# Patient Record
Sex: FEMALE | Race: WHITE | NOT HISPANIC OR LATINO
[De-identification: names, ages, dates, MRNs, and addresses within clinical notes are randomized per-mention and may not be internally consistent; named-entity substitution may affect disease eponyms.]

---

## 2020-10-20 PROBLEM — Z00.00 ENCOUNTER FOR PREVENTIVE HEALTH EXAMINATION: Status: ACTIVE | Noted: 2020-10-20

## 2020-10-21 ENCOUNTER — APPOINTMENT (OUTPATIENT)
Dept: PULMONOLOGY | Facility: CLINIC | Age: 70
End: 2020-10-21
Payer: MEDICARE

## 2020-10-21 VITALS
TEMPERATURE: 97.6 F | HEIGHT: 60 IN | HEART RATE: 84 BPM | SYSTOLIC BLOOD PRESSURE: 157 MMHG | DIASTOLIC BLOOD PRESSURE: 105 MMHG | OXYGEN SATURATION: 96 % | WEIGHT: 129 LBS | BODY MASS INDEX: 25.32 KG/M2

## 2020-10-21 DIAGNOSIS — R93.89 ABNORMAL FINDINGS ON DIAGNOSTIC IMAGING OF OTHER SPECIFIED BODY STRUCTURES: ICD-10-CM

## 2020-10-21 DIAGNOSIS — Z86.79 PERSONAL HISTORY OF OTHER DISEASES OF THE CIRCULATORY SYSTEM: ICD-10-CM

## 2020-10-21 PROCEDURE — 99203 OFFICE O/P NEW LOW 30 MIN: CPT

## 2020-10-21 RX ORDER — AMLODIPINE BESYLATE 5 MG/1
TABLET ORAL
Refills: 0 | Status: ACTIVE | COMMUNITY

## 2020-10-21 NOTE — PHYSICAL EXAM
[No Acute Distress] : no acute distress [Normal Oropharynx] : normal oropharynx [Normal Appearance] : normal appearance [No Neck Mass] : no neck mass [Normal Rate/Rhythm] : normal rate/rhythm [Normal S1, S2] : normal s1, s2 [No Murmurs] : no murmurs [No Resp Distress] : no resp distress [No Abnormalities] : no abnormalities [Benign] : benign [Normal Gait] : normal gait [No Clubbing] : no clubbing [No Cyanosis] : no cyanosis [No Edema] : no edema [FROM] : FROM [Normal Color/ Pigmentation] : normal color/ pigmentation [No Focal Deficits] : no focal deficits [Oriented x3] : oriented x3 [Normal Affect] : normal affect [TextBox_68] : mild expiratory wheezing bilaterally

## 2020-10-21 NOTE — DISCUSSION/SUMMARY
[FreeTextEntry1] : 69 yo female with chronic cough likely related to airway hyperreactivity. She is to have PFT after covid 19 swab as per protocol.I reviewed the chest CT images on line and discussed the findings with the patient.A repeat study will be performed in one year to follow up the nodule noted on recent study.GERD evaluation and treatment was recommended. She is to follow up with her PMD as before.

## 2020-10-21 NOTE — HISTORY OF PRESENT ILLNESS
[Never] : never [TextBox_4] : 71 yo female presents for evaluation of abnormal chest CT finding. The study was performed because of chronic cough. The patient complains of dry cough for one year  without fever, chest pain or hemoptysis.She was treated with inhaled med once two years ago. She has GERD like complaints but denies allergies. 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

## 2020-10-28 ENCOUNTER — APPOINTMENT (OUTPATIENT)
Dept: PULMONOLOGY | Facility: CLINIC | Age: 70
End: 2020-10-28
Payer: MEDICARE

## 2020-10-28 VITALS
SYSTOLIC BLOOD PRESSURE: 152 MMHG | HEIGHT: 62 IN | OXYGEN SATURATION: 97 % | TEMPERATURE: 98.3 F | HEART RATE: 82 BPM | DIASTOLIC BLOOD PRESSURE: 100 MMHG

## 2020-10-28 DIAGNOSIS — R05 COUGH: ICD-10-CM

## 2020-10-28 DIAGNOSIS — Z87.09 PERSONAL HISTORY OF OTHER DISEASES OF THE RESPIRATORY SYSTEM: ICD-10-CM

## 2020-10-28 PROCEDURE — 99213 OFFICE O/P EST LOW 20 MIN: CPT

## 2020-10-28 RX ORDER — METHYLPREDNISOLONE 4 MG/1
4 TABLET ORAL
Qty: 1 | Refills: 0 | Status: ACTIVE | COMMUNITY
Start: 2020-10-28 | End: 1900-01-01

## 2020-10-28 RX ORDER — MONTELUKAST 10 MG/1
10 TABLET, FILM COATED ORAL
Qty: 90 | Refills: 3 | Status: ACTIVE | COMMUNITY
Start: 2020-10-28 | End: 1900-01-01

## 2020-10-28 RX ORDER — OMEPRAZOLE 40 MG/1
40 CAPSULE, DELAYED RELEASE ORAL
Refills: 0 | Status: ACTIVE | COMMUNITY
Start: 2020-10-28

## 2020-10-28 RX ORDER — ALBUTEROL SULFATE 90 UG/1
108 (90 BASE) INHALANT RESPIRATORY (INHALATION)
Qty: 1 | Refills: 3 | Status: ACTIVE | COMMUNITY
Start: 2020-10-28 | End: 1900-01-01

## 2020-11-07 VITALS
OXYGEN SATURATION: 97 % | DIASTOLIC BLOOD PRESSURE: 70 MMHG | WEIGHT: 129 LBS | RESPIRATION RATE: 16 BRPM | BODY MASS INDEX: 23.74 KG/M2 | HEIGHT: 62 IN | SYSTOLIC BLOOD PRESSURE: 152 MMHG | HEART RATE: 82 BPM | TEMPERATURE: 98.3 F

## 2020-11-07 PROBLEM — R05 COUGH: Status: ACTIVE | Noted: 2020-10-21

## 2020-11-07 NOTE — HISTORY OF PRESENT ILLNESS
[Never] : never [TextBox_4] : 69 yo female with hx of cough presents for follow up and PFT. The patient complains of increased productive cough and wheezing for three days. She denies fever, chest pain or hemoptysis. She continues to have GERD related complaints. [TextBox_29] : Denies snoring, daytime somnolence, apneic episodes, AM headaches

## 2020-11-07 NOTE — DISCUSSION/SUMMARY
[FreeTextEntry1] : 71 yo female with acute bronchitis . Medrol pack prescribed with montelukast daily and PRN albuterol MDI. GERD treatment discussed. PFT will be deferred for the future. She is to follow up with her PMD as before.

## 2020-11-18 ENCOUNTER — APPOINTMENT (OUTPATIENT)
Dept: PULMONOLOGY | Facility: CLINIC | Age: 70
End: 2020-11-18

## 2020-12-23 PROBLEM — Z87.09 HISTORY OF ACUTE BRONCHITIS WITH BRONCHOSPASM: Status: RESOLVED | Noted: 2020-11-07 | Resolved: 2020-12-23
